# Patient Record
Sex: FEMALE | Race: ASIAN | NOT HISPANIC OR LATINO
[De-identification: names, ages, dates, MRNs, and addresses within clinical notes are randomized per-mention and may not be internally consistent; named-entity substitution may affect disease eponyms.]

---

## 2018-01-26 ENCOUNTER — APPOINTMENT (OUTPATIENT)
Dept: NEUROSURGERY | Facility: HOSPITAL | Age: 63
End: 2018-01-26
Payer: COMMERCIAL

## 2018-01-26 ENCOUNTER — APPOINTMENT (OUTPATIENT)
Dept: NEUROSURGERY | Facility: CLINIC | Age: 63
End: 2018-01-26
Payer: COMMERCIAL

## 2018-01-26 VITALS
BODY MASS INDEX: 20.55 KG/M2 | OXYGEN SATURATION: 97 % | WEIGHT: 116 LBS | HEIGHT: 63 IN | DIASTOLIC BLOOD PRESSURE: 55 MMHG | SYSTOLIC BLOOD PRESSURE: 94 MMHG | HEART RATE: 63 BPM

## 2018-01-26 DIAGNOSIS — Z78.9 OTHER SPECIFIED HEALTH STATUS: ICD-10-CM

## 2018-01-26 DIAGNOSIS — Z87.898 PERSONAL HISTORY OF OTHER SPECIFIED CONDITIONS: ICD-10-CM

## 2018-01-26 DIAGNOSIS — Z82.49 FAMILY HISTORY OF ISCHEMIC HEART DISEASE AND OTHER DISEASES OF THE CIRCULATORY SYSTEM: ICD-10-CM

## 2018-01-26 DIAGNOSIS — Z86.69 PERSONAL HISTORY OF OTHER DISEASES OF THE NERVOUS SYSTEM AND SENSE ORGANS: ICD-10-CM

## 2018-01-26 DIAGNOSIS — Z83.3 FAMILY HISTORY OF DIABETES MELLITUS: ICD-10-CM

## 2018-01-26 DIAGNOSIS — Z87.39 PERSONAL HISTORY OF OTHER DISEASES OF THE MUSCULOSKELETAL SYSTEM AND CONNECTIVE TISSUE: ICD-10-CM

## 2018-01-26 PROBLEM — Z00.00 ENCOUNTER FOR PREVENTIVE HEALTH EXAMINATION: Status: ACTIVE | Noted: 2018-01-26

## 2018-01-26 PROCEDURE — 99205 OFFICE O/P NEW HI 60 MIN: CPT

## 2018-01-26 RX ORDER — CHROMIUM 200 MCG
TABLET ORAL
Refills: 0 | Status: ACTIVE | COMMUNITY

## 2018-01-26 RX ORDER — ASPIRIN 81 MG
600-200 TABLET, DELAYED RELEASE (ENTERIC COATED) ORAL
Refills: 0 | Status: ACTIVE | COMMUNITY

## 2018-01-26 RX ORDER — METOPROLOL SUCCINATE 25 MG/1
25 TABLET, EXTENDED RELEASE ORAL
Refills: 0 | Status: ACTIVE | COMMUNITY

## 2018-01-26 RX ORDER — MULTIVITAMIN
TABLET ORAL
Refills: 0 | Status: ACTIVE | COMMUNITY

## 2018-02-01 ENCOUNTER — OUTPATIENT (OUTPATIENT)
Dept: OUTPATIENT SERVICES | Facility: HOSPITAL | Age: 63
LOS: 1 days | End: 2018-02-01
Payer: COMMERCIAL

## 2018-02-01 PROCEDURE — 71046 X-RAY EXAM CHEST 2 VIEWS: CPT | Mod: 26

## 2018-02-01 PROCEDURE — 71046 X-RAY EXAM CHEST 2 VIEWS: CPT

## 2018-02-05 ENCOUNTER — OUTPATIENT (OUTPATIENT)
Dept: OUTPATIENT SERVICES | Facility: HOSPITAL | Age: 63
LOS: 1 days | Discharge: HOME | End: 2018-02-05
Payer: COMMERCIAL

## 2018-02-05 PROCEDURE — 36224 PLACE CATH CAROTD ART: CPT | Mod: 50

## 2018-02-05 PROCEDURE — 36226 PLACE CATH VERTEBRAL ART: CPT | Mod: LT

## 2018-02-05 PROCEDURE — C1887: CPT

## 2018-02-05 PROCEDURE — 36223 PLACE CATH CAROTID/INOM ART: CPT | Mod: 59,RT

## 2018-02-05 PROCEDURE — C1894: CPT

## 2018-02-05 PROCEDURE — C1769: CPT

## 2018-02-05 PROCEDURE — 36224 PLACE CATH CAROTD ART: CPT | Mod: LT

## 2018-02-05 PROCEDURE — 76377 3D RENDER W/INTRP POSTPROCES: CPT | Mod: 26

## 2018-02-05 RX ORDER — ACETAMINOPHEN 500 MG
650 TABLET ORAL ONCE
Qty: 0 | Refills: 0 | Status: DISCONTINUED | OUTPATIENT
Start: 2018-02-05 | End: 2018-02-20

## 2018-02-05 RX ORDER — CHLORHEXIDINE GLUCONATE 213 G/1000ML
1 SOLUTION TOPICAL ONCE
Qty: 0 | Refills: 0 | Status: DISCONTINUED | OUTPATIENT
Start: 2018-02-05 | End: 2018-02-20

## 2018-02-05 RX ORDER — SODIUM CHLORIDE 9 MG/ML
1000 INJECTION INTRAMUSCULAR; INTRAVENOUS; SUBCUTANEOUS
Qty: 0 | Refills: 0 | Status: DISCONTINUED | OUTPATIENT
Start: 2018-02-05 | End: 2018-02-20

## 2018-02-05 NOTE — BRIEF OPERATIVE NOTE - PROCEDURE
<<-----Click on this checkbox to enter Procedure Cerebral angiography  02/05/2018    Active  GRESTREP

## 2018-02-05 NOTE — BRIEF OPERATIVE NOTE - OPERATION/FINDINGS
Under MAC sedation, using a 4 fr sheath right CFA, cerebral angiogram was performed.  Findings: right opthalmic level bilobed aneurysm measuring approximately 6mm with a wide neck measuring approximately 4mm.  Bilateral carotid and left vertebral arteries segmental vessel irregularities c/w known fibromuscular dysplasia.  Full report to follow.  Patient tolerated procedure well, no new neurological deficit, hemodynamically stable.  Right groin/vasc access site: Direct manual compression applied, hemostasis achieved, no hematoma.  Patient was transferred to PACU and will go home later today. Under MAC sedation, using a 4 fr sheath right CFA, cerebral angiogram was performed.  Findings: right opthalmic level bilobed aneurysm measuring approximately 6mm with a wide neck measuring approximately 4mm.  Bilateral carotid and left vertebral arteries segmental vessel irregularities c/w known fibromuscular dysplasia.  Full report to follow.  Patient tolerated procedure well, no new neurological deficit, hemodynamically stable.  Right groin/vasc access site: Direct manual compression applied, hemostasis achieved, no hematoma.  Patient was transferred to PACU and will go home later today.  Above d/w Dr. Gray

## 2018-02-07 ENCOUNTER — TRANSCRIPTION ENCOUNTER (OUTPATIENT)
Age: 63
End: 2018-02-07

## 2018-02-08 DIAGNOSIS — K21.9 GASTRO-ESOPHAGEAL REFLUX DISEASE WITHOUT ESOPHAGITIS: ICD-10-CM

## 2018-02-08 DIAGNOSIS — I48.0 PAROXYSMAL ATRIAL FIBRILLATION: ICD-10-CM

## 2018-02-08 DIAGNOSIS — E11.9 TYPE 2 DIABETES MELLITUS WITHOUT COMPLICATIONS: ICD-10-CM

## 2018-02-08 DIAGNOSIS — I77.3 ARTERIAL FIBROMUSCULAR DYSPLASIA: ICD-10-CM

## 2018-02-08 DIAGNOSIS — I67.1 CEREBRAL ANEURYSM, NONRUPTURED: ICD-10-CM

## 2018-02-08 DIAGNOSIS — E78.00 PURE HYPERCHOLESTEROLEMIA, UNSPECIFIED: ICD-10-CM

## 2018-02-08 DIAGNOSIS — I65.23 OCCLUSION AND STENOSIS OF BILATERAL CAROTID ARTERIES: ICD-10-CM

## 2018-02-08 DIAGNOSIS — Z79.01 LONG TERM (CURRENT) USE OF ANTICOAGULANTS: ICD-10-CM

## 2018-02-08 DIAGNOSIS — M81.0 AGE-RELATED OSTEOPOROSIS WITHOUT CURRENT PATHOLOGICAL FRACTURE: ICD-10-CM

## 2018-02-08 DIAGNOSIS — I34.1 NONRHEUMATIC MITRAL (VALVE) PROLAPSE: ICD-10-CM

## 2018-02-08 DIAGNOSIS — R73.03 PREDIABETES: ICD-10-CM

## 2018-02-12 ENCOUNTER — APPOINTMENT (OUTPATIENT)
Dept: NEUROSURGERY | Facility: CLINIC | Age: 63
End: 2018-02-12
Payer: COMMERCIAL

## 2018-02-12 VITALS
OXYGEN SATURATION: 100 % | WEIGHT: 115 LBS | HEART RATE: 53 BPM | BODY MASS INDEX: 20.38 KG/M2 | DIASTOLIC BLOOD PRESSURE: 46 MMHG | SYSTOLIC BLOOD PRESSURE: 86 MMHG | HEIGHT: 63 IN

## 2018-02-12 DIAGNOSIS — Z01.818 ENCOUNTER FOR OTHER PREPROCEDURAL EXAMINATION: ICD-10-CM

## 2018-02-12 PROCEDURE — 99215 OFFICE O/P EST HI 40 MIN: CPT

## 2018-02-12 RX ORDER — APIXABAN 5 MG/1
5 TABLET, FILM COATED ORAL
Refills: 0 | Status: DISCONTINUED | COMMUNITY
End: 2018-02-12

## 2018-02-20 LAB — PLATELET RESPONSE ASPIRIN: 389 ARU

## 2018-02-21 ENCOUNTER — INPATIENT (INPATIENT)
Facility: HOSPITAL | Age: 63
LOS: 1 days | Discharge: HOME | DRG: 27 | End: 2018-02-23
Attending: RADIOLOGY | Admitting: RADIOLOGY
Payer: COMMERCIAL

## 2018-02-21 VITALS — SYSTOLIC BLOOD PRESSURE: 113 MMHG | HEART RATE: 86 BPM | DIASTOLIC BLOOD PRESSURE: 40 MMHG | OXYGEN SATURATION: 99 %

## 2018-02-21 DIAGNOSIS — Z98.891 HISTORY OF UTERINE SCAR FROM PREVIOUS SURGERY: Chronic | ICD-10-CM

## 2018-02-21 LAB
ALBUMIN SERPL ELPH-MCNC: 3.7 G/DL — SIGNIFICANT CHANGE UP (ref 3.3–5)
ALP SERPL-CCNC: 40 U/L — SIGNIFICANT CHANGE UP (ref 40–120)
ALT FLD-CCNC: 16 U/L — SIGNIFICANT CHANGE UP (ref 10–45)
ANION GAP SERPL CALC-SCNC: 15 MMOL/L — SIGNIFICANT CHANGE UP (ref 5–17)
AST SERPL-CCNC: 20 U/L — SIGNIFICANT CHANGE UP (ref 10–40)
BILIRUB SERPL-MCNC: 0.4 MG/DL — SIGNIFICANT CHANGE UP (ref 0.2–1.2)
BUN SERPL-MCNC: 15 MG/DL — SIGNIFICANT CHANGE UP (ref 7–23)
CALCIUM SERPL-MCNC: 8.4 MG/DL — SIGNIFICANT CHANGE UP (ref 8.4–10.5)
CHLORIDE SERPL-SCNC: 104 MMOL/L — SIGNIFICANT CHANGE UP (ref 96–108)
CO2 SERPL-SCNC: 23 MMOL/L — SIGNIFICANT CHANGE UP (ref 22–31)
CREAT SERPL-MCNC: 0.75 MG/DL — SIGNIFICANT CHANGE UP (ref 0.5–1.3)
GLUCOSE SERPL-MCNC: 158 MG/DL — HIGH (ref 70–99)
HCT VFR BLD CALC: 33.4 % — LOW (ref 34.5–45)
HGB BLD-MCNC: 11.2 G/DL — LOW (ref 11.5–15.5)
INR BLD: 1.07 — SIGNIFICANT CHANGE UP (ref 0.88–1.16)
LYMPHOCYTES # BLD AUTO: 11.6 % — LOW (ref 13–44)
MCHC RBC-ENTMCNC: 31 PG — SIGNIFICANT CHANGE UP (ref 27–34)
MCHC RBC-ENTMCNC: 33.5 G/DL — SIGNIFICANT CHANGE UP (ref 32–36)
MCV RBC AUTO: 92.5 FL — SIGNIFICANT CHANGE UP (ref 80–100)
MONOCYTES NFR BLD AUTO: 1.4 % — LOW (ref 2–14)
NEUTROPHILS NFR BLD AUTO: 87 % — HIGH (ref 43–77)
PLATELET # BLD AUTO: 233 K/UL — SIGNIFICANT CHANGE UP (ref 150–400)
POTASSIUM SERPL-MCNC: 3.7 MMOL/L — SIGNIFICANT CHANGE UP (ref 3.5–5.3)
POTASSIUM SERPL-SCNC: 3.7 MMOL/L — SIGNIFICANT CHANGE UP (ref 3.5–5.3)
PROT SERPL-MCNC: 6.2 G/DL — SIGNIFICANT CHANGE UP (ref 6–8.3)
PROTHROM AB SERPL-ACNC: 11.9 SEC — SIGNIFICANT CHANGE UP (ref 9.8–12.7)
RBC # BLD: 3.61 M/UL — LOW (ref 3.8–5.2)
RBC # FLD: 12.9 % — SIGNIFICANT CHANGE UP (ref 10.3–16.9)
SODIUM SERPL-SCNC: 142 MMOL/L — SIGNIFICANT CHANGE UP (ref 135–145)
WBC # BLD: 9.1 K/UL — SIGNIFICANT CHANGE UP (ref 3.8–10.5)
WBC # FLD AUTO: 9.1 K/UL — SIGNIFICANT CHANGE UP (ref 3.8–10.5)

## 2018-02-21 PROCEDURE — 61624 TCAT PERM OCCLS/EMBOLJ CNS: CPT

## 2018-02-21 PROCEDURE — 76377 3D RENDER W/INTRP POSTPROCES: CPT | Mod: 26

## 2018-02-21 PROCEDURE — 75898 FOLLOW-UP ANGIOGRAPHY: CPT | Mod: 26

## 2018-02-21 PROCEDURE — 75894 X-RAYS TRANSCATH THERAPY: CPT | Mod: 26

## 2018-02-21 PROCEDURE — 99291 CRITICAL CARE FIRST HOUR: CPT | Mod: 24

## 2018-02-21 PROCEDURE — 36224 PLACE CATH CAROTD ART: CPT | Mod: RT

## 2018-02-21 RX ORDER — FAMOTIDINE 10 MG/ML
20 INJECTION INTRAVENOUS DAILY
Qty: 0 | Refills: 0 | Status: DISCONTINUED | OUTPATIENT
Start: 2018-02-21 | End: 2018-02-21

## 2018-02-21 RX ORDER — ONDANSETRON 8 MG/1
4 TABLET, FILM COATED ORAL ONCE
Qty: 0 | Refills: 0 | Status: DISCONTINUED | OUTPATIENT
Start: 2018-02-21 | End: 2018-02-21

## 2018-02-21 RX ORDER — DOCUSATE SODIUM 100 MG
100 CAPSULE ORAL THREE TIMES A DAY
Qty: 0 | Refills: 0 | Status: DISCONTINUED | OUTPATIENT
Start: 2018-02-21 | End: 2018-02-23

## 2018-02-21 RX ORDER — SODIUM CHLORIDE 9 MG/ML
1000 INJECTION INTRAMUSCULAR; INTRAVENOUS; SUBCUTANEOUS
Qty: 0 | Refills: 0 | Status: DISCONTINUED | OUTPATIENT
Start: 2018-02-21 | End: 2018-02-22

## 2018-02-21 RX ORDER — ACETAMINOPHEN 500 MG
650 TABLET ORAL EVERY 6 HOURS
Qty: 0 | Refills: 0 | Status: DISCONTINUED | OUTPATIENT
Start: 2018-02-21 | End: 2018-02-23

## 2018-02-21 RX ORDER — METOPROLOL TARTRATE 50 MG
25 TABLET ORAL DAILY
Qty: 0 | Refills: 0 | Status: DISCONTINUED | OUTPATIENT
Start: 2018-02-21 | End: 2018-02-23

## 2018-02-21 RX ORDER — CALCIUM CARBONATE 500(1250)
600 TABLET ORAL
Qty: 0 | Refills: 0 | COMMUNITY

## 2018-02-21 RX ORDER — ONDANSETRON 8 MG/1
4 TABLET, FILM COATED ORAL EVERY 6 HOURS
Qty: 0 | Refills: 0 | Status: DISCONTINUED | OUTPATIENT
Start: 2018-02-21 | End: 2018-02-23

## 2018-02-21 RX ORDER — FOLIC ACID 0.8 MG
1 TABLET ORAL ONCE
Qty: 0 | Refills: 0 | Status: DISCONTINUED | OUTPATIENT
Start: 2018-02-21 | End: 2018-02-21

## 2018-02-21 RX ORDER — INSULIN LISPRO 100/ML
VIAL (ML) SUBCUTANEOUS EVERY 6 HOURS
Qty: 0 | Refills: 0 | Status: DISCONTINUED | OUTPATIENT
Start: 2018-02-21 | End: 2018-02-23

## 2018-02-21 RX ORDER — ASPIRIN/CALCIUM CARB/MAGNESIUM 324 MG
81 TABLET ORAL DAILY
Qty: 0 | Refills: 0 | Status: DISCONTINUED | OUTPATIENT
Start: 2018-02-21 | End: 2018-02-23

## 2018-02-21 RX ORDER — SENNA PLUS 8.6 MG/1
2 TABLET ORAL AT BEDTIME
Qty: 0 | Refills: 0 | Status: DISCONTINUED | OUTPATIENT
Start: 2018-02-21 | End: 2018-02-23

## 2018-02-21 RX ORDER — ASPIRIN/CALCIUM CARB/MAGNESIUM 324 MG
1 TABLET ORAL
Qty: 0 | Refills: 0 | COMMUNITY

## 2018-02-21 RX ORDER — FOLIC ACID 0.8 MG
1 TABLET ORAL DAILY
Qty: 0 | Refills: 0 | Status: DISCONTINUED | OUTPATIENT
Start: 2018-02-21 | End: 2018-02-23

## 2018-02-21 RX ORDER — DOCUSATE SODIUM 100 MG
100 CAPSULE ORAL ONCE
Qty: 0 | Refills: 0 | Status: DISCONTINUED | OUTPATIENT
Start: 2018-02-21 | End: 2018-02-21

## 2018-02-21 RX ORDER — FAMOTIDINE 10 MG/ML
20 INJECTION INTRAVENOUS ONCE
Qty: 0 | Refills: 0 | Status: COMPLETED | OUTPATIENT
Start: 2018-02-21 | End: 2018-02-21

## 2018-02-21 RX ORDER — ACETAMINOPHEN 500 MG
650 TABLET ORAL EVERY 6 HOURS
Qty: 0 | Refills: 0 | Status: DISCONTINUED | OUTPATIENT
Start: 2018-02-21 | End: 2018-02-21

## 2018-02-21 RX ORDER — METOPROLOL TARTRATE 50 MG
25 TABLET ORAL ONCE
Qty: 0 | Refills: 0 | Status: DISCONTINUED | OUTPATIENT
Start: 2018-02-21 | End: 2018-02-21

## 2018-02-21 RX ORDER — CLOPIDOGREL BISULFATE 75 MG/1
75 TABLET, FILM COATED ORAL DAILY
Qty: 0 | Refills: 0 | Status: DISCONTINUED | OUTPATIENT
Start: 2018-02-21 | End: 2018-02-23

## 2018-02-21 RX ORDER — CLOPIDOGREL BISULFATE 75 MG/1
1 TABLET, FILM COATED ORAL
Qty: 0 | Refills: 0 | COMMUNITY

## 2018-02-21 RX ORDER — CHLORHEXIDINE GLUCONATE 213 G/1000ML
1 SOLUTION TOPICAL ONCE
Qty: 0 | Refills: 0 | Status: DISCONTINUED | OUTPATIENT
Start: 2018-02-21 | End: 2018-02-23

## 2018-02-21 RX ORDER — SENNA PLUS 8.6 MG/1
2 TABLET ORAL ONCE
Qty: 0 | Refills: 0 | Status: DISCONTINUED | OUTPATIENT
Start: 2018-02-21 | End: 2018-02-21

## 2018-02-21 RX ORDER — ATORVASTATIN CALCIUM 80 MG/1
20 TABLET, FILM COATED ORAL AT BEDTIME
Qty: 0 | Refills: 0 | Status: DISCONTINUED | OUTPATIENT
Start: 2018-02-21 | End: 2018-02-23

## 2018-02-21 RX ORDER — METOPROLOL TARTRATE 50 MG
1 TABLET ORAL
Qty: 0 | Refills: 0 | COMMUNITY

## 2018-02-21 RX ORDER — ATORVASTATIN CALCIUM 80 MG/1
1 TABLET, FILM COATED ORAL
Qty: 0 | Refills: 0 | COMMUNITY

## 2018-02-21 RX ORDER — FOLIC ACID 0.8 MG
1 TABLET ORAL
Qty: 0 | Refills: 0 | COMMUNITY

## 2018-02-21 RX ADMIN — SODIUM CHLORIDE 100 MILLILITER(S): 9 INJECTION INTRAMUSCULAR; INTRAVENOUS; SUBCUTANEOUS at 15:25

## 2018-02-21 RX ADMIN — Medication 100 MILLIGRAM(S): at 22:05

## 2018-02-21 RX ADMIN — ATORVASTATIN CALCIUM 20 MILLIGRAM(S): 80 TABLET, FILM COATED ORAL at 22:03

## 2018-02-21 RX ADMIN — Medication 2: at 17:53

## 2018-02-21 RX ADMIN — SENNA PLUS 2 TABLET(S): 8.6 TABLET ORAL at 22:04

## 2018-02-21 RX ADMIN — Medication 1 MILLIGRAM(S): at 22:04

## 2018-02-21 RX ADMIN — FAMOTIDINE 20 MILLIGRAM(S): 10 INJECTION INTRAVENOUS at 14:46

## 2018-02-21 NOTE — H&P ADULT - PMH
Brain aneurysm    Fibromuscular dysplasia  cervical carptids.  MVP (mitral valve prolapse)  No significant MR (per Cardiologist) Brain aneurysm    Fibromuscular dysplasia  cervical carotids.  MVP (mitral valve prolapse)  No significant MR (per Cardiologist)

## 2018-02-21 NOTE — H&P ADULT - NSHPPHYSICALEXAM_GEN_ALL_CORE
General: Pleasant lady in NAD  Neuro: A&O x 3, speech clear and fluent, EOMI, PERRLA  CN 2-12 intact  Motor: 5/5 X 4, no pronator drift  Sensory: Intact.  Gait and balance: Normal  Heart: S1-S2, RRR  Lungs: CTA B/L, no rales, no rhonchi  Abd: Soft, NT, ND, + BS x4  Skin: NO rashes, no ecchymosis.  LE Pulses: Right DP 2+, PT 1+, Left: DP 2+, PT 1+

## 2018-02-21 NOTE — PROGRESS NOTE ADULT - SUBJECTIVE AND OBJECTIVE BOX
=================================  NEUROCRITICAL CARE ATTENDING NOTE  =================================    RAO MORGAN   MRN-[id number]  Summary:  62y/F with Afib on chronic anticoagualtion, (Eliquis) FMD.  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  Diagnostic angio confirmed aneurysm.  Admitted today  for elective coiling of aneurysm.  Overnight Events:  Admitted to NSICU this afternoon.    PAST MEDICAL & SURGICAL HISTORY: Fibromuscular dysplasia: cervical carotids. Brain aneurysm MVP (mitral valve prolapse): No significant MR (per Cardiologist) H/O: : x 2  NKDA  home meds:  metoprolol 25mg OD plavix 75mg daily as 81mg daily folic acid atorvastatin 20mg daily caltrate 600mg daily     PHYSICAL EXAMINATION  T(C): 37.1 ( @ 17:11), Max: 37.1 ( @ 17:11) HR: 88 ( @ 17:11) (82 - 98) BP: 101/46 ( @ 17:11) (99/44 - 113/40) RR: 52 ( @ 17:11) (15 - 52)SpO2: 98% ( @ 17:11) (98% - 99%)  NEUROLOGIC EXAMINATION:  Patient is awake, alert, fully oriented, pupils 2-3mm equal and briskly reactive to light, EOMs intact, muscle strength 5/5 on all 4 extremities  GENERAL:  not intubated, not in cardiorespiratory distress  EENT: anicteric  CARDIOVASC:  (+) S1 S2, normal rate and irregularly irregular rhythm  PULMONARY:  clear to auscultation bilaterally  ABDOMEN:  soft, nontender, with normoactive bowel sounds  EXTREMITIES:  no edema, no groin hematoma, R, distal pulses strong  SKIN:  no rash    LABS:  CAPILLARY BLOOD GLUCOSE 159     @ 07:01  -   @ 18:50  IN: 300 mL / OUT: 195 mL / NET: 105 mL    Bacteriology:  CSF studies:  EEG:  Neuroimaging: none in the system  Other imagin/01 CXR:  clear lungs    MEDICATIONS: asa 81mg daily atorvastatin 20mg HS peridex plavix 75mg daily docusate 100mg x1 famotidine 20mg daily folic acid 1mg x1 mod ISS [id number] x1 senna PRN    IV FLUIDS: NS@100cc/hr  DRIPS:  DIET: regular diet  Lines: Agatha Hendrickson  Drains:      CODE STATUS:  full code                       GOALS OF CARE:  aggressive                      DISPOSITION:  ICU

## 2018-02-21 NOTE — PROGRESS NOTE ADULT - SUBJECTIVE AND OBJECTIVE BOX
NEUROSURGERY POST OP NOTE:    POD# 0 S/P cerebral angiography with embolization of Right superior hypophyseal artery aneurysm    S: Patient is doing well post op. C/o chest discomfort from GERD. Gaye, HA, CP, SOB.       T(C): --  HR: 86 (02-21-18 @ 14:41) (82 - 98)  BP: 104/51 (02-21-18 @ 14:41) (99/44 - 113/40)  RR: 18 (02-21-18 @ 14:41) (15 - 29)  SpO2: 98% (02-21-18 @ 14:41) (98% - 99%)        acetaminophen   Tablet. 650 milliGRAM(s) Oral every 6 hours PRN  aspirin enteric coated 81 milliGRAM(s) Oral daily  atorvastatin 20 milliGRAM(s) Oral at bedtime  chlorhexidine 4% Liquid 1 Application(s) Topical Once  clopidogrel Tablet 75 milliGRAM(s) Oral daily  docusate sodium 100 milliGRAM(s) Oral Once  famotidine    Tablet 20 milliGRAM(s) Oral daily  folic acid 1 milliGRAM(s) Oral Once  metoprolol succinate ER 25 milliGRAM(s) Oral Once  ondansetron Injectable 4 milliGRAM(s) IV Push Once PRN  senna 2 Tablet(s) Oral Once PRN  sodium chloride 0.9%. 1000 milliLiter(s) IV Continuous <Continuous>      RADIOLOGY:     Exam:  Gen: laying down flat in hospital bed comfortable, NAD  Neuro: AA+Ox3, following commands, opens eyes spontaneously  CN II-XII: grossly intact, EOMI, PERRL, face symmetric  MAEx4, strength R>L  Pulses: DP 2+ b/l    WOUND/DRAINS: R groin dressing C/D/I, in inflammation or erythema     Assessment: 63yo Female s/p cerebral angiography with embolization of R superior hypophyseal artery aneurysm POD #0      Plan:  - Neuro check Q1 hour  - pain control  - transfer to ICU  - HOB flat to 15 degrees     D/w Dr. Gray

## 2018-02-21 NOTE — BRIEF OPERATIVE NOTE - OPERATION/FINDINGS
Under general anesthesia, using a 6 fr sheath right CFA, cerebral angiogram was performed.  Findings c/w approximately 6mm medially pointing wide neck aneurysm.  Attempted balloon assisted coil embolization unsuccessful, stent assisted coil embolization using a ( LVIS Blue 4.5 x 23 cm and microcoils x2)  Full report to follow  Patient tolerated procedure well, remained hemodynamically stable, no new neurological deficits.  Right groin/vasc access site: Perclose, hemostasis achieved, no hematoma.  Patient was transferred to PACU  Above d/w Dr. Gray

## 2018-02-21 NOTE — PROGRESS NOTE ADULT - ASSESSMENT
62F with   1.  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 Gray, Dr. Mina)  2.  fibromuscular dysplasia of cervical carotids  3.  mitral valve prolapse  4.  atrial fibrillation in controlled ventricular rate    PLAN:   NEURO: neurochecks q1h, PRN pain meds with Tylenol  s/p stent-coil of aneurysm; continue dual antiplatelet therapy, no accumetrics as per neurointerventional  REHAB:  physical therapy evaluation and management    EARLY MOB:  flat x 4 hours, then HOB up    PULM:  Room air, incentive spirometry  CARDIO:  SBP goal 100-150mm Hg, Afib rate controlled - currently off anticoagulation, continue metoprolol; baseline EKG  ENDO:  Blood sugar goals 140-180 mg/dL, continue insulin sliding scale  GI:  d/c famotidine  DIET: regular diet  RENAL:  d/c IVF once eating adequately  HEM/ONC: check post-op Hb  VTE Prophylaxis: SCDs only, no DVT chemoprophylaxis for now as patient is high risk for bleed (fresh post-op)  ID: afebrile, no leukocytosis  Social: will update family    Patient at high risk for neurological deterioration or death due to:  groin hematoma, hypotension / shock, ICU delirium, DVT / PE, cardioembolic stroke.	  Critical care time, excluding procedures: 60 minutes.

## 2018-02-21 NOTE — H&P ADULT - FAMILY HISTORY
Mother  Still living? No  Family history of CHF (congestive heart failure), Age at diagnosis: Age Unknown  Family history of cardiomyopathy, Age at diagnosis: Age Unknown

## 2018-02-21 NOTE — H&P ADULT - PROBLEM SELECTOR PLAN 1
Cerebral angiogram and endovascular balloon/stent assisted coil embolization of right Superior hypophyseal artery aneurysm.  NSICU for overnight observation  Neuro checks  Right arteriotomy site checks, along with vitals and pulses.  Continue Plavix and Aspirin.

## 2018-02-21 NOTE — H&P ADULT - HISTORY OF PRESENT ILLNESS
62 years-old woman with history of atrial fibrillation on chronic anticoagulation (Eliquis) and fibromuscular dysplasia. 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 She isn’t a smoker and does not have family history of cerebral aneurysms. a diagnostic cerebral angiogram was performed that confirmed the presence of a wide neck irregular 6 mm aneurysm of the right superior hypophyseal artery. There was also evidence of fibromuscular dysplasia in bilateral ICAs.   After discussion in cerebrovascular conference it was determined that the aneurysm should be treated because of size and irregular shape. We are recommending balloon assisted coil embolization.   Patient presents for above named procedure, she has no neurological deficits. 62 years-old woman with history of atrial fibrillation on chronic anticoagulation (Eliquis) and fibromuscular dysplasia. 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 She isn’t a smoker and does not have family history of cerebral aneurysms. a diagnostic cerebral angiogram was performed that confirmed the presence of a wide neck irregular 6 mm aneurysm of the right superior hypophyseal artery. There was also evidence of fibromuscular dysplasia in bilateral ICAs.   After discussion in cerebrovascular conference it was determined that the aneurysm should be treated because of size and irregular shape. We are recommending balloon assisted coil embolization.  Patient presents for above named procedure, she has no neurological deficits. 62 years-old woman with history of atrial fibrillation on chronic anticoagulation (Eliquis) and fibromuscular dysplasia. 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 She isn’t a smoker and does not have family history of cerebral aneurysms. a diagnostic cerebral angiogram was performed that confirmed the presence of a wide neck irregular 6 mm aneurysm of the right superior hypophyseal artery. There was also evidence of fibromuscular dysplasia in bilateral ICAs.   After discussion in cerebrovascular conference it was determined that the aneurysm should be treated because of size and irregular shape.  Patient presents for cerebral angiography and balloon/Stent assisted coil embolization of aneurysm, she has no neurological deficits.

## 2018-02-21 NOTE — BRIEF OPERATIVE NOTE - PROCEDURE
<<-----Click on this checkbox to enter Procedure Cerebral angiography with embolization of intracranial aneurysm  02/21/2018  Stent Assisted Coil Embolization of Aneurysm  Active  GRESTREP

## 2018-02-22 ENCOUNTER — TRANSCRIPTION ENCOUNTER (OUTPATIENT)
Age: 63
End: 2018-02-22

## 2018-02-22 LAB
ANION GAP SERPL CALC-SCNC: 8 MMOL/L — SIGNIFICANT CHANGE UP (ref 5–17)
BUN SERPL-MCNC: 15 MG/DL — SIGNIFICANT CHANGE UP (ref 7–23)
CALCIUM SERPL-MCNC: 8.2 MG/DL — LOW (ref 8.4–10.5)
CHLORIDE SERPL-SCNC: 107 MMOL/L — SIGNIFICANT CHANGE UP (ref 96–108)
CO2 SERPL-SCNC: 26 MMOL/L — SIGNIFICANT CHANGE UP (ref 22–31)
CREAT SERPL-MCNC: 0.65 MG/DL — SIGNIFICANT CHANGE UP (ref 0.5–1.3)
GLUCOSE SERPL-MCNC: 140 MG/DL — HIGH (ref 70–99)
HCT VFR BLD CALC: 29.7 % — LOW (ref 34.5–45)
HGB BLD-MCNC: 10.1 G/DL — LOW (ref 11.5–15.5)
MAGNESIUM SERPL-MCNC: 2.2 MG/DL — SIGNIFICANT CHANGE UP (ref 1.6–2.6)
MCHC RBC-ENTMCNC: 31.2 PG — SIGNIFICANT CHANGE UP (ref 27–34)
MCHC RBC-ENTMCNC: 34 G/DL — SIGNIFICANT CHANGE UP (ref 32–36)
MCV RBC AUTO: 91.7 FL — SIGNIFICANT CHANGE UP (ref 80–100)
PHOSPHATE SERPL-MCNC: 3.5 MG/DL — SIGNIFICANT CHANGE UP (ref 2.5–4.5)
PLATELET # BLD AUTO: 205 K/UL — SIGNIFICANT CHANGE UP (ref 150–400)
POTASSIUM SERPL-MCNC: 3.9 MMOL/L — SIGNIFICANT CHANGE UP (ref 3.5–5.3)
POTASSIUM SERPL-SCNC: 3.9 MMOL/L — SIGNIFICANT CHANGE UP (ref 3.5–5.3)
RBC # BLD: 3.24 M/UL — LOW (ref 3.8–5.2)
RBC # FLD: 13.5 % — SIGNIFICANT CHANGE UP (ref 10.3–16.9)
SODIUM SERPL-SCNC: 141 MMOL/L — SIGNIFICANT CHANGE UP (ref 135–145)
WBC # BLD: 8.9 K/UL — SIGNIFICANT CHANGE UP (ref 3.8–10.5)
WBC # FLD AUTO: 8.9 K/UL — SIGNIFICANT CHANGE UP (ref 3.8–10.5)

## 2018-02-22 PROCEDURE — 99232 SBSQ HOSP IP/OBS MODERATE 35: CPT

## 2018-02-22 RX ORDER — POTASSIUM CHLORIDE 20 MEQ
20 PACKET (EA) ORAL ONCE
Qty: 0 | Refills: 0 | Status: COMPLETED | OUTPATIENT
Start: 2018-02-22 | End: 2018-02-22

## 2018-02-22 RX ADMIN — Medication 20 MILLIEQUIVALENT(S): at 07:32

## 2018-02-22 RX ADMIN — ATORVASTATIN CALCIUM 20 MILLIGRAM(S): 80 TABLET, FILM COATED ORAL at 21:03

## 2018-02-22 RX ADMIN — Medication 25 MILLIGRAM(S): at 10:00

## 2018-02-22 RX ADMIN — Medication 100 MILLIGRAM(S): at 14:37

## 2018-02-22 RX ADMIN — CLOPIDOGREL BISULFATE 75 MILLIGRAM(S): 75 TABLET, FILM COATED ORAL at 12:00

## 2018-02-22 RX ADMIN — Medication 100 MILLIGRAM(S): at 05:17

## 2018-02-22 RX ADMIN — Medication 81 MILLIGRAM(S): at 12:00

## 2018-02-22 RX ADMIN — Medication 1 MILLIGRAM(S): at 12:00

## 2018-02-22 NOTE — DISCHARGE NOTE ADULT - PLAN OF CARE
resume ADLs call to schedule follow up appointment with Dr. Gray in 2 weeks  if groin swelling, redness, worsening headache noted please call office immediately or report to ED

## 2018-02-22 NOTE — DISCHARGE NOTE ADULT - MEDICATION SUMMARY - MEDICATIONS TO TAKE
I will START or STAY ON the medications listed below when I get home from the hospital:    aspirin 81 mg oral tablet  -- 1 tab(s) by mouth once a day  -- Indication: For Brain aneurysm    Caltrate 600 mg oral tablet  -- 600 milligram(s) orally  -- Indication: For Fibromuscular dysplasia    atorvastatin 20 mg oral tablet  -- 1 tab(s) by mouth once a day  -- Indication: For HLD    Plavix 75 mg oral tablet  -- 1 tab(s) by mouth once a day  -- Indication: For Brain aneurysm    metoprolol succinate 25 mg oral tablet, extended release  -- 1 tab(s) by mouth once a day  -- Indication: For HTN    folic acid 1 mg oral tablet  -- 1 tab(s) by mouth once a day  -- Indication: For Nutritional

## 2018-02-22 NOTE — PROGRESS NOTE ADULT - ASSESSMENT
Right superior hypophyseal aneurysm status post stent assisted coiling    Continue aspirin and Plavix    Follow up MRA brain in 3 months.  Follow up cerebral angiogram in 6 months.

## 2018-02-22 NOTE — DISCHARGE NOTE ADULT - HOSPITAL COURSE
Patient admitted for elective rt CFA catheter angio. Under general anesthesia, using a 6 fr sheath right CFA, cerebral angiogram was performed.  Findings c/w approximately 6mm medially pointing wide neck aneurysm. Attempted balloon assisted coil embolization unsuccessful, stent assisted coil embolization using a ( LVIS Blue 4.5 x 23 cm and microcoils x2)    Patient tolerated procedure well, remained hemodynamically stable, no new neurological deficits. Right groin/vasc access site: Perclose, hemostasis achieved, no hematoma. Patient was transferred to PACU. a line and lunsford removed POD 1. Patient cleared for dc home, instructed to return to clinic for follow up in 2 weeks. Patient admitted for elective rt CFA catheter angio. Under general anesthesia, using a 6 fr sheath right CFA, cerebral angiogram was performed.  Findings c/w approximately 6mm medially pointing wide neck aneurysm. Attempted balloon assisted coil embolization unsuccessful, stent assisted coil embolization using a ( LVIS Blue 4.5 x 23 cm and microcoils x2)    Patient tolerated procedure well, remained hemodynamically stable, no new neurological deficits. Right groin/vasc access site: Perclose, hemostasis achieved, no hematoma. Patient was transferred to PACU. a line and lunsford removed POD 1. Dizzy and unable to tolerate >15 feet on POD#1, kept inpatient for further observation. Patient cleared for dc home by PT on POD#2, instructed to return to clinic for follow up in 2 weeks. Dizziness improved/resolved.

## 2018-02-22 NOTE — DISCHARGE NOTE ADULT - CARE PROVIDER_API CALL
Jeremiah Gray), Neurology; Vascular Neurology  [address], [place] [address]  Phone: [phone]  Fax: [phone]

## 2018-02-22 NOTE — DISCHARGE NOTE ADULT - NS AS ACTIVITY OBS
Walking-Outdoors allowed/Showering allowed/Walking-Indoors allowed/Bathing allowed/No Heavy lifting/straining/Stairs allowed

## 2018-02-22 NOTE — PHYSICAL THERAPY INITIAL EVALUATION ADULT - SENSORY TESTS
(R) hand dominant; (L) hand  5/5, (R) hand  5/5. CN Testing: B/L Frontalis intact; B/L buccinator intact; smile symmetrical; tongue protrusion at midline; B/L eyes open/close intact; Shoulder elevation: intact bilaterally; Vision H-Test: bilateral tracking and smooth pursuit intact; Convergence/Divergence: intact; Vision Quadrant Test: intact bilaterally. Rapid alternating movements: intact bilaterally

## 2018-02-22 NOTE — DISCHARGE NOTE ADULT - ADDITIONAL INSTRUCTIONS
Dr. Gray in 2 weeks Dr. Gray in 2 weeks, please call the office for an appointment.  May shower, steri-strips on right groin will fall off on their own.

## 2018-02-22 NOTE — PHYSICAL THERAPY INITIAL EVALUATION ADULT - GAIT DISTANCE, PT EVAL
30 feet x 1 (gait distance limited 2/2 patient with increased complaints of "feeling like a bobble head/very lightheaded"; VSS, HS=797/66, HR=88, JOEY humphreys

## 2018-02-22 NOTE — PROGRESS NOTE ADULT - SUBJECTIVE AND OBJECTIVE BOX
HPI:  62 years-old woman with history of atrial fibrillation on chronic anticoagulation (Eliquis) and fibromuscular dysplasia. 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 She isn’t a smoker and does not have family history of cerebral aneurysms. a diagnostic cerebral angiogram was performed that confirmed the presence of a wide neck irregular 6 mm aneurysm of the right superior hypophyseal artery. There was also evidence of fibromuscular dysplasia in bilateral ICAs.   After discussion in cerebrovascular conference it was determined that the aneurysm should be treated because of size and irregular shape.  Patient presents for cerebral angiography and balloon/Stent assisted coil embolization of aneurysm, she has no neurological deficits. (21 Feb 2018 07:10)    catheter angio yesterday  PACU overnight  mild headache, denies any CP, SOB or difficulty breathing    OVERNIGHT EVENTS:  Vital Signs Last 24 Hrs  T(C): 36.8 (22 Feb 2018 08:00), Max: 37.1 (21 Feb 2018 17:11)  T(F): 98.3 (22 Feb 2018 08:00), Max: 98.8 (21 Feb 2018 17:11)  HR: 64 (22 Feb 2018 09:00) (54 - 98)  BP: 107/44 (22 Feb 2018 09:00) (87/38 - 113/40)  BP(mean): 70 (22 Feb 2018 09:00) (53 - 76)  RR: 19 (22 Feb 2018 09:00) (14 - 44)  SpO2: 100% (22 Feb 2018 09:00) (98% - 100%)    I&O's Detail    21 Feb 2018 07:01  -  22 Feb 2018 07:00  --------------------------------------------------------  IN:    Oral Fluid: 450 mL    sodium chloride 0.9%.: 1700 mL  Total IN: 2150 mL    OUT:    Indwelling Catheter - Urethral: 815 mL  Total OUT: 815 mL    Total NET: 1335 mL      22 Feb 2018 07:01  -  22 Feb 2018 09:07  --------------------------------------------------------  IN:    Oral Fluid: 100 mL    sodium chloride 0.9%.: 200 mL  Total IN: 300 mL    OUT:    Indwelling Catheter - Urethral: 60 mL  Total OUT: 60 mL    Total NET: 240 mL        I&O's Summary    21 Feb 2018 07:01  -  22 Feb 2018 07:00  --------------------------------------------------------  IN: 2150 mL / OUT: 815 mL / NET: 1335 mL    22 Feb 2018 07:01  -  22 Feb 2018 09:07  --------------------------------------------------------  IN: 300 mL / OUT: 60 mL / NET: 240 mL        PHYSICAL EXAM:  Neurological:  A&O x 3, comfortable  EOMI, PERRL  face symmetric, neg drift  CALDERÓN x 4, motor 5/5 throughout  breathing non labored  abd soft NTND  distal pulses intact  groin site c/d/i    TUBES/LINES:  [] CVC  [x] A-line  [] Lumbar Drain  [] Ventriculostomy  [] Other    DIET:  [] NPO  [x] Mechanical  [] Tube feeds    LABS:                        10.1   8.9   )-----------( 205      ( 22 Feb 2018 04:16 )             29.7     02-22    141  |  107  |  15  ----------------------------<  140<H>  3.9   |  26  |  0.65    Ca    8.2<L>      22 Feb 2018 04:16  Phos  3.5     02-22  Mg     2.2     02-22    TPro  6.2  /  Alb  3.7  /  TBili  0.4  /  DBili  x   /  AST  20  /  ALT  16  /  AlkPhos  40  02-21    PT/INR - ( 21 Feb 2018 21:04 )   PT: 11.9 sec;   INR: 1.07                  CAPILLARY BLOOD GLUCOSE      POCT Blood Glucose.: 98 mg/dL (22 Feb 2018 05:21)  POCT Blood Glucose.: 138 mg/dL (22 Feb 2018 00:19)  POCT Blood Glucose.: 159 mg/dL (21 Feb 2018 17:42)      Drug Levels: [] N/A    CSF Analysis: [] N/A      Allergies    No Known Allergies    Intolerances      MEDICATIONS:  Antibiotics:    Neuro:  acetaminophen   Tablet 650 milliGRAM(s) Oral every 6 hours PRN  acetaminophen   Tablet. 650 milliGRAM(s) Oral every 6 hours PRN  ondansetron Injectable 4 milliGRAM(s) IV Push every 6 hours PRN    Anticoagulation:  aspirin enteric coated 81 milliGRAM(s) Oral daily  clopidogrel Tablet 75 milliGRAM(s) Oral daily    OTHER:  atorvastatin 20 milliGRAM(s) Oral at bedtime  chlorhexidine 4% Liquid 1 Application(s) Topical Once  docusate sodium 100 milliGRAM(s) Oral three times a day  insulin lispro (HumaLOG) corrective regimen sliding scale   SubCutaneous every 6 hours  metoprolol succinate ER 25 milliGRAM(s) Oral daily  senna 2 Tablet(s) Oral at bedtime    IVF:  folic acid 1 milliGRAM(s) Oral daily  sodium chloride 0.9%. 1000 milliLiter(s) IV Continuous <Continuous>    CULTURES:    RADIOLOGY & ADDITIONAL TESTS:      ASSESSMENT:  62y Female s/p cath angio POD 1 neuro intact    PLAN:  NEURO:  neuro checks  tylenol prn  cont asa/plavix  dispo planning    CARDIOVASCULAR:  stable  dc a line    PULMONARY:  RA    RENAL:  HLIV  d/c lunsford    GI:  diet as tolerated  bowel regimen    HEME:  h/h stable    ID:  afeb  no abx    ENDO:  ISS    DVT PROPHYLAXIS:  [x] Venodynes                                [] Heparin/Lovenox    DISPOSITION:   SDU status  full code  dispo planning  d/w Dr. Gray and ICU house staff

## 2018-02-22 NOTE — DISCHARGE NOTE ADULT - CARE PLAN
Principal Discharge DX:	Brain aneurysm  Goal:	resume ADLs  Assessment and plan of treatment:	call to schedule follow up appointment with Dr. Gray in 2 weeks  if groin swelling, redness, worsening headache noted please call office immediately or report to ED

## 2018-02-22 NOTE — PATIENT PROFILE ADULT. - NS TRANSFER RESPONSE BELONGING
History of Present Illness





- General


History Source: Patient


Exam Limitations: No Limitations





- History of Present Illness


Initial Comments: 


CHIEF COMPLAINT:  40 y/o [id number] with known uterine fibroids and IUD c/o 

abnormal vaginal bleeding for the past 2 days.  





HISTORY OF PRESENT ILLNESS:  The patient states her LMP was 5/15/17 and are 

normally every 28 days.  She states 2 days ago she started having vaginal 

bleeding that she feels is not normal menstrual bleeding.  She does also have 

some lower abdominal cramping.  She has had to change her pad twice a day.  She 

denies f/c, n/v/d, CP, SOB, back pain, hematuria, dysuria.  She recently moved 

here and does not have an OB/GYN. 





Vital signs on arrival are within normal limits. 





REVIEW OF SYSTEMS:


GENERAL/CONSTITUTIONAL: No fever/chills. No weakness. No weight change.


HEAD, EYES, EARS, NOSE AND THROAT: No change in vision. No ear pain or 

discharge. No sore throat.


CARDIOVASCULAR: No chest pain or shortness of breath.


RESPIRATORY: No cough, wheezing, or hemoptysis.


GASTROINTESTINAL: +lower abdominal pain.  No nausea, vomiting, diarrhea, 

constipation. 


GENITOURINARY: No dysuria, frequency, or change in urination.  +vaginal bleeding


MUSCULOSKELETAL: No joint or muscle swelling or pain. No neck or back pain.


SKIN: No rash or easy bruising.


NEUROLOGIC: No headache, vertigo, loss of consciousness, or loss of sensation.





PHYSICAL EXAM:


GENERAL: The patient is awake, alert, and fully oriented, in no acute distress.

  She is very well appearing, ambulatory, in NAD or obvious discomfort. 


HEAD: Normal with no signs of trauma.


ENT: Pupils equal, round and reactive to light, extraocular movements intact, 

sclera anicteric, conjunctiva clear. Neck supple.


LUNGS: Clear to auscultation bilaterally. Normal excursion. No respiratory 

distress or use of accessory muscles.


CV: RRR, S1/S2, no MRG. Cap refill < 2 sec.


ABDOMEN: Soft, non-distended, non-tender even to deep palpation, no 

hepatomegaly or splenomegaly, no masses.


VAGINAL:  Copious red blood with large clot in the vaginal canal.  Cervix 

cannot be visualized secondary to blood.  Manual exam reveals minimal CMT.  No 

adenexal tenderness b/l. 


EXTREMITIES: Normal range of motion, no edema.


NEUROLOGICAL: Normal speech, normal gait. CN II-XII grossly intact.


PSYCH: Normal mood, normal affect.


SKIN: Warm, dry, normal turgor, no rashes or lesions noted.














<Lisette Field - Last Filed: 17 10:36>





<Kiki Montalvo - Last Filed: 17 15:39>





- General


Chief Complaint: Vaginal Bleeding


Stated Complaint: VAGINAL BLEEDING


Time Seen by Provider: 17 08:24





Past History





- Past Medical History


Asthma: Yes





- Psycho/Social/Smoking Cessation Hx


Anxiety: No


Suicidal Ideation: No


Smoking History: Never smoked


Hx Alcohol Use: Yes (occasionally)


Drug/Substance Use Hx: No





<Lisette Field - Last Filed: 17 10:36>





<Kiki Montalvo - Last Filed: 17 15:39>





- Past Medical History


Allergies/Adverse Reactions: 


 Allergies











Allergy/AdvReac Type Severity Reaction Status Date / Time


 


No Known Allergies Allergy   Verified 17 08:11











Home Medications: 


Ambulatory Orders





NK [No Known Home Medication]  17 











*Physical Exam





- Vital Signs


 Last Vital Signs











Temp Pulse Resp BP Pulse Ox


 


 97.7 F   63   16   114/68   100 


 


 17 08:09  17 08:09  17 08:09  17 08:09  17 08:09














<Lisette Field - Last Filed: 17 10:36>





- Vital Signs


 Last Vital Signs











Temp Pulse Resp BP Pulse Ox


 


 98 F   64   16   109/71   100 


 


 17 10:53  17 10:53  17 10:53  17 10:53  17 10:53














<Kiki Montalvo - Last Filed: 17 15:39>





ED Treatment Course





- LABORATORY


CBC & Chemistry Diagram: 


 17 09:10





 17 09:10





<Lisette Field - Last Filed: 17 10:36>





- LABORATORY


CBC & Chemistry Diagram: 


 17 09:10





 17 09:10





- ADDITIONAL ORDERS


Additional order review: 














 17 08:42 Urine Culture - Final





 Urine - Urine Clean Catch    NO GROWTH OBTAINED








 











  17





  09:10


 


RBC  3.83


 


MCV  90.2


 


MCHC  33.9


 


RDW  13.5


 


MPV  8.4


 


Neutrophils %  47.7


 


Lymphocytes %  33.1


 


Monocytes %  13.0 H


 


Eosinophils %  4.9 H


 


Basophils %  1.3














<Kiki Montalvo - Last Filed: 17 15:39>





Medical Decision Making





- Medical Decision Making


A/P:  40 y/o female with abnormal vaginal bleeding.  Plan is as follows:





1. UA/culture/hcg


2. Labs


3. Transvaginal Ultrasound





Transvaginal Ultrasound


IMPRESSION:  IUD and small uterine fibroids.  Free pelvic fluids.





Labs unremarkable.


UA unremarkable. 





Patient given results.  Suggested this may either be her normal menstrual 

period just early or bleeding fibroids.  Suggested she f/u with Dr. Sheehan as 

soon as possible for follow up and return to the ER with any worsening or 

concerning symptoms. 





The patient verbalizes understanding of all instructions, has no further 

questions and is awaiting discharge.





<Lisette Field - Last Filed: 17 10:36>





*DC/Admit/Observation/Transfer





<Lisette Field - Last Filed: 17 10:36>





- Attestations


Physician Attestion: 


I reviewed the case with the mid-level practitioner and agree with the mid-

level practitioner's assessment, diagnosis and disposition.








<Kiki Montalvo - Last Filed: 17 15:39>


Diagnosis at time of Disposition: 


 Abnormal vaginal bleeding





- Discharge Dispostion


Disposition: HOME


Condition at time of disposition: Good





- Referrals


Referrals: 


Tej Sheehan MD [Staff Physician] - 1 week





- Patient Instructions


Printed Discharge Instructions:  DI for Menorrhagia


Additional Instructions: 


Discharge Instructions:


-The ultrasound of your uterus showed fibroids and a normally placed IUD


-Please follow up with Dr. Sheehan or an OB/GYN of your choice as soon as 

possible for further work up


-Return to the ER with any worsening or concerning symptoms








- Post Discharge Activity


Work/School Note:  Back to Work
yes

## 2018-02-22 NOTE — DISCHARGE NOTE ADULT - PATIENT PORTAL LINK FT
You can access the [facility] Patient Portal, offered by [facility], by registering with the following website: [email or web address]

## 2018-02-22 NOTE — PHYSICAL THERAPY INITIAL EVALUATION ADULT - ADDITIONAL COMMENTS
Patient reports independence with all ADLs/IADLs prior to admission. No HHA. Denies mechanical falls prior to admission. Patient is (R) hand dominant and wears visual aides for daily use.

## 2018-02-22 NOTE — PHYSICAL THERAPY INITIAL EVALUATION ADULT - GENERAL OBSERVATIONS, REHAB EVAL
Chart reviewed. IE Completed. Patient without complaints of pain at rest, agreeable to PT. Patient received OOB in chair, NAD, +tele, +IV hep lock, +(R) groin dressing C/D/I, JOEY Calvillo cleared patient for treatment.

## 2018-02-22 NOTE — PHYSICAL THERAPY INITIAL EVALUATION ADULT - GAIT DEVIATIONS NOTED, PT EVAL
decreased idania/decreased step length/slightly unsteady gait however no LOB noted, narrow JOSE and significantly decreased step length

## 2018-02-22 NOTE — PHYSICAL THERAPY INITIAL EVALUATION ADULT - PERTINENT HX OF CURRENT PROBLEM, REHAB EVAL
62 years-old woman with history of atrial fibrillation on chronic anticoagulation (Eliquis) and fibromuscular dysplasia. 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 Please refer to H&P on [place] for remaining.

## 2018-02-23 VITALS — SYSTOLIC BLOOD PRESSURE: 108 MMHG | OXYGEN SATURATION: 100 % | HEART RATE: 60 BPM | DIASTOLIC BLOOD PRESSURE: 50 MMHG

## 2018-02-23 LAB — PA ADP PRP-ACNC: 104 PRU

## 2018-02-23 PROCEDURE — C1769: CPT

## 2018-02-23 PROCEDURE — 80048 BASIC METABOLIC PNL TOTAL CA: CPT

## 2018-02-23 PROCEDURE — C1887: CPT

## 2018-02-23 PROCEDURE — 80053 COMPREHEN METABOLIC PANEL: CPT

## 2018-02-23 PROCEDURE — C1889: CPT

## 2018-02-23 PROCEDURE — 36415 COLL VENOUS BLD VENIPUNCTURE: CPT

## 2018-02-23 PROCEDURE — 85610 PROTHROMBIN TIME: CPT

## 2018-02-23 PROCEDURE — C1876: CPT

## 2018-02-23 PROCEDURE — 85025 COMPLETE CBC W/AUTO DIFF WBC: CPT

## 2018-02-23 PROCEDURE — 83735 ASSAY OF MAGNESIUM: CPT

## 2018-02-23 PROCEDURE — 97116 GAIT TRAINING THERAPY: CPT

## 2018-02-23 PROCEDURE — C1894: CPT

## 2018-02-23 PROCEDURE — 97161 PT EVAL LOW COMPLEX 20 MIN: CPT

## 2018-02-23 PROCEDURE — C1760: CPT

## 2018-02-23 PROCEDURE — 85027 COMPLETE CBC AUTOMATED: CPT

## 2018-02-23 PROCEDURE — 82962 GLUCOSE BLOOD TEST: CPT

## 2018-02-23 PROCEDURE — 84100 ASSAY OF PHOSPHORUS: CPT

## 2018-02-23 PROCEDURE — C2628: CPT

## 2018-02-23 RX ADMIN — Medication 1 MILLIGRAM(S): at 11:07

## 2018-02-23 RX ADMIN — CLOPIDOGREL BISULFATE 75 MILLIGRAM(S): 75 TABLET, FILM COATED ORAL at 11:07

## 2018-02-23 RX ADMIN — Medication 81 MILLIGRAM(S): at 11:07

## 2018-02-23 RX ADMIN — Medication 25 MILLIGRAM(S): at 06:04

## 2018-02-23 NOTE — PROGRESS NOTE ADULT - SUBJECTIVE AND OBJECTIVE BOX
HPI:  62 years-old woman with history of atrial fibrillation on chronic anticoagulation (Eliquis) and fibromuscular dysplasia. 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 She isn’t a smoker and does not have family history of cerebral aneurysms. a diagnostic cerebral angiogram was performed that confirmed the presence of a wide neck irregular 6 mm aneurysm of the right superior hypophyseal artery. There was also evidence of fibromuscular dysplasia in bilateral ICAs.   After discussion in cerebrovascular conference it was determined that the aneurysm should be treated because of size and irregular shape.  Patient presents for cerebral angiography and balloon/Stent assisted coil embolization of aneurysm, she has no neurological deficits. (21 Feb 2018 07:10)    OVERNIGHT EVENTS:  Vital Signs Last 24 Hrs  T(C): 37 (23 Feb 2018 09:31), Max: 37.1 (22 Feb 2018 12:00)  T(F): 98.6 (23 Feb 2018 09:31), Max: 98.7 (22 Feb 2018 12:00)  HR: 56 (23 Feb 2018 08:33) (53 - 68)  BP: 108/51 (23 Feb 2018 08:33) (104/49 - 117/47)  BP(mean): 73 (23 Feb 2018 08:33) (66 - 80)  RR: 12 (23 Feb 2018 08:33) (12 - 20)  SpO2: 100% (23 Feb 2018 08:33) (97% - 100%)    I&O's Summary    22 Feb 2018 07:01  -  23 Feb 2018 07:00  --------------------------------------------------------  IN: 1150 mL / OUT: 710 mL / NET: 440 mL        PHYSICAL EXAM:  Neurological:    Motor exam:         [] Upper extremity              Bi(c5)  WE(c6)  EE(c7)   FF(c8)                                                R         5/5        5/5        5/5       5/5                                               L          5/5        5/5        5/5       5/5         [] Lower extremeity          HF(l2)   KE(l3)    TA(l4)   EHL(l5)  GS(s1)                                                 R        5/5        5/5        5/5       5/5         5/5                                               L         5/5        5/5       5/5       5/5          5/5                                                        [] warm well perfused; capillary refill <3 seconds     Sensation: [] intact to light touch  [] decreased:       Cardiovascular:  Respiratory:  Gastrointestinal:  Genitourinary:  Extremities:  Incision/Wound:    TUBES/LINES:  [] Hendrickson  [] Lumbar Drain  [] Wound Drains  [] Others      DIET:  [] NPO  [] Mechanical  [] Tube feeds    LABS:                        10.1   8.9   )-----------( 205      ( 22 Feb 2018 04:16 )             29.7     02-22    141  |  107  |  15  ----------------------------<  140<H>  3.9   |  26  |  0.65    Ca    8.2<L>      22 Feb 2018 04:16  Phos  3.5     02-22  Mg     2.2     02-22    TPro  6.2  /  Alb  3.7  /  TBili  0.4  /  DBili  x   /  AST  20  /  ALT  16  /  AlkPhos  40  02-21    PT/INR - ( 21 Feb 2018 21:04 )   PT: 11.9 sec;   INR: 1.07                  CAPILLARY BLOOD GLUCOSE  125 (22 Feb 2018 12:00)      POCT Blood Glucose.: 79 mg/dL (23 Feb 2018 06:51)  POCT Blood Glucose.: 104 mg/dL (22 Feb 2018 16:30)  POCT Blood Glucose.: 125 mg/dL (22 Feb 2018 11:43)      Drug Levels: [] N/A    CSF Analysis: [] N/A      Allergies    No Known Allergies    Intolerances      MEDICATIONS:  Antibiotics:    Neuro:  acetaminophen   Tablet 650 milliGRAM(s) Oral every 6 hours PRN  acetaminophen   Tablet. 650 milliGRAM(s) Oral every 6 hours PRN  ondansetron Injectable 4 milliGRAM(s) IV Push every 6 hours PRN    Anticoagulation:  aspirin enteric coated 81 milliGRAM(s) Oral daily  clopidogrel Tablet 75 milliGRAM(s) Oral daily    OTHER:  atorvastatin 20 milliGRAM(s) Oral at bedtime  chlorhexidine 4% Liquid 1 Application(s) Topical Once  docusate sodium 100 milliGRAM(s) Oral three times a day  insulin lispro (HumaLOG) corrective regimen sliding scale   SubCutaneous every 6 hours  metoprolol succinate ER 25 milliGRAM(s) Oral daily  senna 2 Tablet(s) Oral at bedtime    IVF:  folic acid 1 milliGRAM(s) Oral daily    CULTURES:    RADIOLOGY & ADDITIONAL TESTS:      ASSESSMENT:  62y Female s/p Cerebral angiogram for right superior hypophyseal artery aneurysm  stent assisted coil embolization POD#2          PLAN:  -Awaiting PT evaluation for discharge needs,  -Cont Plavix/ASA,  -OOB/PO as tolerated,  -Anticipate DC home,  -Will d/w Dr. Gray HPI:  62 years-old woman with history of atrial fibrillation on chronic anticoagulation (Eliquis) and fibromuscular dysplasia. 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 She isn’t a smoker and does not have family history of cerebral aneurysms. a diagnostic cerebral angiogram was performed that confirmed the presence of a wide neck irregular 6 mm aneurysm of the right superior hypophyseal artery. There was also evidence of fibromuscular dysplasia in bilateral ICAs.   After discussion in cerebrovascular conference it was determined that the aneurysm should be treated because of size and irregular shape.  Patient presents for cerebral angiography and balloon/Stent assisted coil embolization of aneurysm, she has no neurological deficits. (21 Feb 2018 07:10)    OVERNIGHT EVENTS: No significant events. Tolerating OOB. Minimal dizziness when first getting up.    Vital Signs Last 24 Hrs  T(C): 37 (23 Feb 2018 09:31), Max: 37.1 (22 Feb 2018 12:00)  T(F): 98.6 (23 Feb 2018 09:31), Max: 98.7 (22 Feb 2018 12:00)  HR: 56 (23 Feb 2018 08:33) (53 - 68)  BP: 108/51 (23 Feb 2018 08:33) (104/49 - 117/47)  BP(mean): 73 (23 Feb 2018 08:33) (66 - 80)  RR: 12 (23 Feb 2018 08:33) (12 - 20)  SpO2: 100% (23 Feb 2018 08:33) (97% - 100%)    I&O's Summary    22 Feb 2018 07:01  -  23 Feb 2018 07:00  --------------------------------------------------------  IN: 1150 mL / OUT: 710 mL / NET: 440 mL        PHYSICAL EXAM:  Gen: NAD, AAOx3.  HEENT: PERRL. EOMI. VF intact.  Neck: FROM, nontender  Lungs: Clear b/l  Heart: S1, S2. NSR.  Abd: Soft, NT/ND. +BS  Exts: Right groin C/D/I. Pulses 2+ throughout  Neuro: CNs II-XII intact. 5/5 str x4 exts. Sensation to LT intact. Gait intact.    TUBES/LINES:  [] Hendrickson  [] Lumbar Drain  [] Wound Drains  [] Others      DIET:  [] NPO  [x] Mechanical  [] Tube feeds    LABS:                        10.1   8.9   )-----------( 205      ( 22 Feb 2018 04:16 )             29.7     02-22    141  |  107  |  15  ----------------------------<  140<H>  3.9   |  26  |  0.65    Ca    8.2<L>      22 Feb 2018 04:16  Phos  3.5     02-22  Mg     2.2     02-22    TPro  6.2  /  Alb  3.7  /  TBili  0.4  /  DBili  x   /  AST  20  /  ALT  16  /  AlkPhos  40  02-21    PT/INR - ( 21 Feb 2018 21:04 )   PT: 11.9 sec;   INR: 1.07                  CAPILLARY BLOOD GLUCOSE  125 (22 Feb 2018 12:00)      POCT Blood Glucose.: 79 mg/dL (23 Feb 2018 06:51)  POCT Blood Glucose.: 104 mg/dL (22 Feb 2018 16:30)  POCT Blood Glucose.: 125 mg/dL (22 Feb 2018 11:43)      Drug Levels: [] N/A    CSF Analysis: [] N/A      Allergies    No Known Allergies    Intolerances      MEDICATIONS:  Antibiotics:    Neuro:  acetaminophen   Tablet 650 milliGRAM(s) Oral every 6 hours PRN  acetaminophen   Tablet. 650 milliGRAM(s) Oral every 6 hours PRN  ondansetron Injectable 4 milliGRAM(s) IV Push every 6 hours PRN    Anticoagulation:  aspirin enteric coated 81 milliGRAM(s) Oral daily  clopidogrel Tablet 75 milliGRAM(s) Oral daily    OTHER:  atorvastatin 20 milliGRAM(s) Oral at bedtime  chlorhexidine 4% Liquid 1 Application(s) Topical Once  docusate sodium 100 milliGRAM(s) Oral three times a day  insulin lispro (HumaLOG) corrective regimen sliding scale   SubCutaneous every 6 hours  metoprolol succinate ER 25 milliGRAM(s) Oral daily  senna 2 Tablet(s) Oral at bedtime    IVF:  folic acid 1 milliGRAM(s) Oral daily    CULTURES:    RADIOLOGY & ADDITIONAL TESTS:      ASSESSMENT:  62y Female s/p Cerebral angiogram for right superior hypophyseal artery aneurysm  stent assisted coil embolization POD#2          PLAN:  -Awaiting PT evaluation for discharge needs,  -Cont Plavix/ASA,  -OOB/PO as tolerated,  -Anticipate DC home,  -Will d/w Dr. Gray

## 2018-02-27 DIAGNOSIS — I77.3 ARTERIAL FIBROMUSCULAR DYSPLASIA: ICD-10-CM

## 2018-02-27 DIAGNOSIS — Z79.82 LONG TERM (CURRENT) USE OF ASPIRIN: ICD-10-CM

## 2018-02-27 DIAGNOSIS — Z82.49 FAMILY HISTORY OF ISCHEMIC HEART DISEASE AND OTHER DISEASES OF THE CIRCULATORY SYSTEM: ICD-10-CM

## 2018-02-27 DIAGNOSIS — I34.1 NONRHEUMATIC MITRAL (VALVE) PROLAPSE: ICD-10-CM

## 2018-02-27 DIAGNOSIS — E78.5 HYPERLIPIDEMIA, UNSPECIFIED: ICD-10-CM

## 2018-02-27 DIAGNOSIS — Z79.01 LONG TERM (CURRENT) USE OF ANTICOAGULANTS: ICD-10-CM

## 2018-02-27 DIAGNOSIS — I67.1 CEREBRAL ANEURYSM, NONRUPTURED: ICD-10-CM

## 2018-02-27 DIAGNOSIS — I48.91 UNSPECIFIED ATRIAL FIBRILLATION: ICD-10-CM

## 2018-03-08 ENCOUNTER — APPOINTMENT (OUTPATIENT)
Dept: NEUROSURGERY | Facility: CLINIC | Age: 63
End: 2018-03-08
Payer: COMMERCIAL

## 2018-03-08 VITALS
WEIGHT: 115 LBS | HEART RATE: 73 BPM | HEIGHT: 63 IN | DIASTOLIC BLOOD PRESSURE: 72 MMHG | BODY MASS INDEX: 20.38 KG/M2 | SYSTOLIC BLOOD PRESSURE: 109 MMHG | OXYGEN SATURATION: 93 %

## 2018-03-08 DIAGNOSIS — E78.00 PURE HYPERCHOLESTEROLEMIA, UNSPECIFIED: ICD-10-CM

## 2018-03-08 DIAGNOSIS — I48.91 UNSPECIFIED ATRIAL FIBRILLATION: ICD-10-CM

## 2018-03-08 PROCEDURE — 99215 OFFICE O/P EST HI 40 MIN: CPT

## 2018-03-08 RX ORDER — CHROMIUM 200 MCG
TABLET ORAL
Refills: 0 | Status: ACTIVE | COMMUNITY

## 2018-03-08 RX ORDER — ATORVASTATIN CALCIUM 20 MG/1
20 TABLET, FILM COATED ORAL
Refills: 0 | Status: ACTIVE | COMMUNITY

## 2018-05-17 ENCOUNTER — APPOINTMENT (OUTPATIENT)
Dept: MRI IMAGING | Facility: HOSPITAL | Age: 63
End: 2018-05-17
Payer: COMMERCIAL

## 2018-05-17 ENCOUNTER — OUTPATIENT (OUTPATIENT)
Dept: OUTPATIENT SERVICES | Facility: HOSPITAL | Age: 63
LOS: 1 days | End: 2018-05-17
Payer: COMMERCIAL

## 2018-05-17 DIAGNOSIS — Z98.891 HISTORY OF UTERINE SCAR FROM PREVIOUS SURGERY: Chronic | ICD-10-CM

## 2018-05-17 PROCEDURE — 70545 MR ANGIOGRAPHY HEAD W/DYE: CPT

## 2018-05-17 PROCEDURE — A9585: CPT

## 2018-05-17 PROCEDURE — 70545 MR ANGIOGRAPHY HEAD W/DYE: CPT | Mod: 26

## 2018-05-22 ENCOUNTER — APPOINTMENT (OUTPATIENT)
Dept: NEUROSURGERY | Facility: CLINIC | Age: 63
End: 2018-05-22
Payer: COMMERCIAL

## 2018-05-22 VITALS
RESPIRATION RATE: 16 BRPM | SYSTOLIC BLOOD PRESSURE: 100 MMHG | HEART RATE: 60 BPM | HEIGHT: 63 IN | WEIGHT: 118 LBS | BODY MASS INDEX: 20.91 KG/M2 | OXYGEN SATURATION: 100 % | DIASTOLIC BLOOD PRESSURE: 65 MMHG | TEMPERATURE: 97.5 F

## 2018-05-22 PROCEDURE — 99215 OFFICE O/P EST HI 40 MIN: CPT

## 2018-08-22 ENCOUNTER — OUTPATIENT (OUTPATIENT)
Dept: OUTPATIENT SERVICES | Facility: HOSPITAL | Age: 63
LOS: 1 days | Discharge: NURSING, REHAB OR LONG-TERM CARE | End: 2018-08-22
Payer: COMMERCIAL

## 2018-08-22 DIAGNOSIS — Z98.891 HISTORY OF UTERINE SCAR FROM PREVIOUS SURGERY: Chronic | ICD-10-CM

## 2018-08-22 DIAGNOSIS — I67.1 CEREBRAL ANEURYSM, NONRUPTURED: ICD-10-CM

## 2018-08-22 PROCEDURE — C1894: CPT

## 2018-08-22 PROCEDURE — 36224 PLACE CATH CAROTD ART: CPT | Mod: RT

## 2018-08-22 PROCEDURE — 36228 PLACE CATH INTRACRANIAL ART: CPT

## 2018-08-22 PROCEDURE — 36224 PLACE CATH CAROTD ART: CPT | Mod: 50

## 2018-08-22 PROCEDURE — C1760: CPT

## 2018-08-22 PROCEDURE — C1725: CPT

## 2018-08-22 PROCEDURE — C1769: CPT

## 2018-08-22 RX ORDER — SODIUM CHLORIDE 9 MG/ML
500 INJECTION INTRAMUSCULAR; INTRAVENOUS; SUBCUTANEOUS
Qty: 0 | Refills: 0 | Status: DISCONTINUED | OUTPATIENT
Start: 2018-08-22 | End: 2018-08-22

## 2018-08-22 RX ORDER — CHLORHEXIDINE GLUCONATE 213 G/1000ML
1 SOLUTION TOPICAL ONCE
Qty: 0 | Refills: 0 | Status: DISCONTINUED | OUTPATIENT
Start: 2018-08-22 | End: 2018-08-22

## 2018-08-22 RX ORDER — ACETAMINOPHEN 500 MG
1000 TABLET ORAL ONCE
Qty: 0 | Refills: 0 | Status: DISCONTINUED | OUTPATIENT
Start: 2018-08-22 | End: 2018-08-22

## 2018-08-22 RX ORDER — CLOPIDOGREL BISULFATE 75 MG/1
75 TABLET, FILM COATED ORAL DAILY
Qty: 30 | Refills: 0 | Status: DISCONTINUED | OUTPATIENT
Start: 2018-02-12 | End: 2018-08-22

## 2018-08-22 RX ORDER — ASPIRIN 81 MG
81 TABLET,CHEWABLE ORAL DAILY
Refills: 0 | Status: ACTIVE | COMMUNITY

## 2018-08-22 RX ORDER — ACETAMINOPHEN 500 MG
650 TABLET ORAL ONCE
Qty: 0 | Refills: 0 | Status: DISCONTINUED | OUTPATIENT
Start: 2018-08-22 | End: 2018-08-22

## 2018-08-22 NOTE — BRIEF OPERATIVE NOTE - POST-OP DX
Cerebral aneurysm without rupture  08/22/2018  Right sup hypophyseal aneurysm previously treated  Active  Troy Gutierrez

## 2018-08-22 NOTE — BRIEF OPERATIVE NOTE - PROCEDURE
<<-----Click on this checkbox to enter Procedure Cerebral angiography  08/22/2018    Active  GRESTREP

## 2018-08-22 NOTE — PROGRESS NOTE ADULT - SUBJECTIVE AND OBJECTIVE BOX
Neuroendovascular Pre Procedure Note    HPI: 70 year old female w/ PMH of Paroxysmal Afib, HLD, MVP, GERD, Fibromuscular Dysplasia known to Neurosurgery for previous cerebral angiogram for stent and balloon assisted endovascular coil embolization of right superior hypophyseal aneurysm in 2018 presents today for routine diagnostic angiogram 6 months post procedure. She reports doing well with no complaints of pain. She denies visual or hearing changes, extremity weakness or numbness, or changes in gait. She has been taking Aspirin and Plavix since her procedure.    ROS: Patient reports left ankle pain and swelling which is chronic  PMH: PAF, MVP, HLD, GERD, Osteoporosis, Fibromuscular Dysplasia  PSH: As above,  x2, D&C  SocHx: Denies smoking, illicit drug or ETOH abuse. Retired nurse, lives with .  Allergies: NKDA  Meds: Metoprolol 25mg BID, Zantac 300mg daily, Folic Acid 1mg daily, Caltrate D 600/400mg BID, Vitamin D 50,000iu weekly, Aspirin 81mg daily, Plavix 75mg daily    Exam: NAD, AAOx3  HEENT: PERRL. EOMI. VF intact. NC/AT.  Neck; FROM, nontender  Lungs: Clear b/l  Heart: S1, S2. NSR.  Abd: Soft, flat, NT/ND. +BS  Exts: Pulses 2+ throughout, No C/C/E. Tender to left knee palpation  Neuro: CNs II-XII intact. 5/5 str x4 extremities. Sensation to LT intact. Following commands. Gait WNL. No pronator drift.

## 2018-08-22 NOTE — BRIEF OPERATIVE NOTE - OPERATION/FINDINGS
Under MAC sedation, using a 5 fr sheath right CFA, cerebral angiogram was performed.  Findings: Persistent and completely obliterated right superior hypophyseal aneurysm with microcoils and stent.  The stent is widely patent without stenosis.  Full report to follow.  Patient tolerated procedure well, no new neurological deficit, hemodynamically stable.  Right groin/vasc access site: perclose, direct manual compression applied, hemostasis achieved, no hematoma.  Patient was transferred to PACU and will go home later today.  Above d/w Dr. Gray

## 2018-08-22 NOTE — PROGRESS NOTE ADULT - PROBLEM SELECTOR PLAN 1
Consent in chart for diagnostic cerebral angiogram, all risks, benefits and alternatives discussed including but not limited to infection, bleeding and stroke.  Outpatient medical clearance reviewed,  D/w Dr. Gray.

## 2018-08-22 NOTE — PROGRESS NOTE ADULT - ASSESSMENT
62 year old female with previous endovascular treatment of right superior hypophyseal artery aneurysm for elective diagnostic cerebral angiogram.

## 2018-08-24 RX ORDER — APIXABAN 5 MG/1
5 TABLET, FILM COATED ORAL
Refills: 0 | Status: ACTIVE | COMMUNITY

## 2019-07-17 ENCOUNTER — APPOINTMENT (OUTPATIENT)
Dept: MRI IMAGING | Facility: HOSPITAL | Age: 64
End: 2019-07-17
Payer: COMMERCIAL

## 2019-07-17 ENCOUNTER — OUTPATIENT (OUTPATIENT)
Dept: OUTPATIENT SERVICES | Facility: HOSPITAL | Age: 64
LOS: 1 days | End: 2019-07-17
Payer: COMMERCIAL

## 2019-07-17 DIAGNOSIS — Z98.891 HISTORY OF UTERINE SCAR FROM PREVIOUS SURGERY: Chronic | ICD-10-CM

## 2019-07-17 PROCEDURE — A9577: CPT

## 2019-07-17 PROCEDURE — 70546 MR ANGIOGRAPH HEAD W/O&W/DYE: CPT

## 2019-07-17 PROCEDURE — A9579: CPT

## 2019-07-17 PROCEDURE — 70546 MR ANGIOGRAPH HEAD W/O&W/DYE: CPT | Mod: 26

## 2019-07-18 ENCOUNTER — APPOINTMENT (OUTPATIENT)
Dept: NEUROSURGERY | Facility: CLINIC | Age: 64
End: 2019-07-18
Payer: COMMERCIAL

## 2019-07-18 VITALS
BODY MASS INDEX: 20.73 KG/M2 | OXYGEN SATURATION: 98 % | RESPIRATION RATE: 16 BRPM | SYSTOLIC BLOOD PRESSURE: 96 MMHG | WEIGHT: 117 LBS | HEART RATE: 58 BPM | HEIGHT: 63 IN | DIASTOLIC BLOOD PRESSURE: 57 MMHG

## 2019-07-18 PROCEDURE — 99215 OFFICE O/P EST HI 40 MIN: CPT

## 2019-07-18 NOTE — REASON FOR VISIT
[Follow-Up: _____] : a [unfilled] follow-up visit [FreeTextEntry1] : to review 1 year follow MRA brain for balloon and stent assisted endovascular coiling of RIGHT superior hypophyseal aneurysm

## 2019-07-18 NOTE — ASSESSMENT
[FreeTextEntry1] : MRA brain from 7/17/19 reviewed by Dr. Gray showing no recanalization.\par \par Plan:\par 1. Two year follow up MRA brain and RTO in 2 years. Advised patient to call the office 1 month before procedure date.

## 2019-07-18 NOTE — HISTORY OF PRESENT ILLNESS
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 Follow up angiogram from 8/22/18 showed no coil compaction or recanalization of the aneurysm. Plavix was stopped and was instructed to continue baby aspirin and restart OAC for her AF.  She remains on aspirin. No new neurological complains. She presents today to review a 1 year follow up MRA brain.\par \par \par Handedness: RIGHT\par \par Laura Suarez\par [address] [address]\par (h) [phone]\par (c) [phone]\par \par Dr. Hussein(cardiology)\par [address] [address]\par (T) [phone]\par (F) [phone]\par \par Yogesh Mackay, 7[address] (referring physician)\par [facility]\par [address]\par [place], [place] [address]FZ\par \par

## 2019-07-18 NOTE — PHYSICAL EXAM
[General Appearance - Alert] : alert [General Appearance - In No Acute Distress] : in no acute distress [Oriented To Time, Place, And Person] : oriented to person, place, and time [Impaired Insight] : insight and judgment were intact [Person] : oriented to person [Place] : oriented to place [Time] : oriented to time [Cranial Nerves Optic (II)] : visual acuity intact bilaterally,  pupils equal round and reactive to light [Cranial Nerves Oculomotor (III)] : extraocular motion intact [Cranial Nerves Trigeminal (V)] : facial sensation intact symmetrically [Cranial Nerves Facial (VII)] : face symmetrical [Cranial Nerves Vestibulocochlear (VIII)] : hearing was intact bilaterally [Cranial Nerves Glossopharyngeal (IX)] : tongue and palate midline [Cranial Nerves Accessory (XI - Cranial And Spinal)] : head turning and shoulder shrug symmetric [Cranial Nerves Hypoglossal (XII)] : there was no tongue deviation with protrusion [Motor Tone] : muscle tone was normal in all four extremities [Motor Strength] : muscle strength was normal in all four extremities [Neck Appearance] : the appearance of the neck was normal [] : no respiratory distress [Respiration, Rhythm And Depth] : normal respiratory rhythm and effort [Exaggerated Use Of Accessory Muscles For Inspiration] : no accessory muscle use [Apical Impulse] : the apical impulse was normal [Edema] : there was no peripheral edema [Abnormal Walk] : normal gait

## 2021-04-12 ENCOUNTER — APPOINTMENT (OUTPATIENT)
Dept: MRI IMAGING | Facility: HOSPITAL | Age: 66
End: 2021-04-12

## 2021-04-12 ENCOUNTER — RESULT REVIEW (OUTPATIENT)
Age: 66
End: 2021-04-12

## 2021-04-12 ENCOUNTER — OUTPATIENT (OUTPATIENT)
Dept: OUTPATIENT SERVICES | Facility: HOSPITAL | Age: 66
LOS: 1 days | End: 2021-04-12
Payer: MEDICARE

## 2021-04-12 DIAGNOSIS — Z98.891 HISTORY OF UTERINE SCAR FROM PREVIOUS SURGERY: Chronic | ICD-10-CM

## 2021-04-12 PROCEDURE — 70546 MR ANGIOGRAPH HEAD W/O&W/DYE: CPT

## 2021-04-12 PROCEDURE — A9585: CPT

## 2021-04-12 PROCEDURE — 70546 MR ANGIOGRAPH HEAD W/O&W/DYE: CPT | Mod: 26

## 2021-04-12 PROCEDURE — A9579: CPT

## 2021-04-27 ENCOUNTER — APPOINTMENT (OUTPATIENT)
Dept: NEUROSURGERY | Facility: CLINIC | Age: 66
End: 2021-04-27
Payer: MEDICARE

## 2021-04-27 VITALS
HEIGHT: 63 IN | DIASTOLIC BLOOD PRESSURE: 61 MMHG | HEART RATE: 58 BPM | RESPIRATION RATE: 12 BRPM | OXYGEN SATURATION: 98 % | WEIGHT: 118 LBS | TEMPERATURE: 96.8 F | SYSTOLIC BLOOD PRESSURE: 103 MMHG | BODY MASS INDEX: 20.91 KG/M2

## 2021-04-27 PROCEDURE — 99072 ADDL SUPL MATRL&STAF TM PHE: CPT

## 2021-04-27 PROCEDURE — 99213 OFFICE O/P EST LOW 20 MIN: CPT

## 2021-04-27 NOTE — ADDENDUM
[FreeTextEntry1] : 2021\par \par Yogesh Mackay MD\par [address] [facility]\par [address]\[place] [address]\par \par Patient’s Name:  Laura Suarez\par :  1955\par \par Dear Dr. Mackay:\par \par We saw Mrs. Suarez in our office at [facility].  As you know, she is a 65 years-old right-handed woman with history of atrial fibrillation on chronic anticoagulation (Eliquis) and fibromuscular dysplasia.  On a CTA obtained as part of the work up for fibromuscular dysplasia she was diagnosed with an incidental 6 mm wide neck irregular aneurysm of the right superior hypophyseal artery.  She isn’t a smoker and doesn’t have family history of cerebral aneurysms.\par \par She underwent balloon and stent (LVIS) assisted coil embolization of the right superior hypophyseal aneurysm on 2018.  The procedure and recovery were uneventful.  A follow up cerebral angiogram from 2018 demonstrated no in-stent stenosis, coil compaction or recanalization of the treated aneurysm.  A follow up MRA of the brain from earlier this month again demonstrated no recanalization.  A follow up MRA of the brain is recommended in 1 year to assess the treated aneurysm as well as a 1 mm aneurysmal dilatation at the top of the left ICA that has remained stable over the years.  She will follow up in our office at that time.\par \par Thank you for allowing us to participate in Mrs. Suarez’s care.  I will keep you updated at all times.\par \par Sincerely,\par \par \par Jeremiah Gray MD\par Chief\par Neuro-Endovascular Surgery and \par Interventional Neuroradiology \par [facility]\par \par [facility]\par [address]\par [address]rd Floor\[place] [address]\par T:  [phone]\par F:  [phone]\par \par cc:	Buddy Mina MD\par 	Chair, Neurosurgery\par 	[facility]\par 	[address]\par 	3rd Floor\[place] 	[place] [address]\par \par 	Nadeem Hussein MD\par 	[address]\par 	[facility] [place] [address]\par \par 	Mrs. Sanchez Daniela\par 	[address]\par 	[place] [address]\par \par

## 2021-04-27 NOTE — REASON FOR VISIT
[Follow-Up: _____] : a [unfilled] follow-up visit [FreeTextEntry1] : TODAY'S VISIT:\par She is compliant with ASA for stent protection. She complains of GI reflux but has not seen GI for it. She takes ASA with food. No bleeding.\par \par Denies severe new onset headache.\par

## 2021-04-27 NOTE — PHYSICAL EXAM
[General Appearance - Alert] : alert [General Appearance - In No Acute Distress] : in no acute distress [Oriented To Time, Place, And Person] : oriented to person, place, and time [Impaired Insight] : insight and judgment were intact [Affect] : the affect was normal [Neck Appearance] : the appearance of the neck was normal [] : no respiratory distress [Respiration, Rhythm And Depth] : normal respiratory rhythm and effort [Exaggerated Use Of Accessory Muscles For Inspiration] : no accessory muscle use [Apical Impulse] : the apical impulse was normal [Heart Rate And Rhythm] : heart rate was normal and rhythm regular [Abnormal Walk] : normal gait

## 2021-04-27 NOTE — DATA REVIEWED
[de-identified] : [facility]\par    [facility] Department of Radiology\par   Radiology Report\par \par \par Patient Name: EDDIE SOSA  Report Date: 13-Apr-2021 18:15.00 \par Patient ID: [id number] ([id number]), [id number] (EPI)  Accession No.: [id number] \par Patient Birth Date: 1955  Report Status: F \par Referring Physician: [id number] CIPRIANO HAMM   Reason For Study: 2 YR F/U: HX OF STENT ASSISTED COIL EMBOLIZATION OF RT SUPERIOR HYPOPHYSEAL ART  \par \par \par \par \par \par EXAM: MR ANGIO BRAIN WAW IC\par \par PROCEDURE DATE: 04/12/2021\par \par \par \par INTERPRETATION: PROCEDURE: MRA brain with and without contrast.\par \par INDICATION: Stent assisted right superior hypophyseal aneurysm, follow-up\par \par TECHNIQUE: The MRA of the [place] of [place] was performed utilizing 3 D TOF technique. Limited imaging of the brain was obtained including axial DWI and FLAIR. Following the intravenous bolus injection of 7.5 mL IV Gadavist, dynamic MRA was performed utilizing TWIST technique.\par \par COMPARISON: MRA brain 7/17/2019\par \par FINDINGS: Status post stent assisted coil embolization of right superior hypophyseal aneurysm. There is no evidence of flow related signal or abnormal enhancement within the aneurysm sac to suggest residual or recurrent aneurysm.\par \par There is an apparent 1 mm superior directed outpouching of the left carotid terminus (series 103 image 10) which in retrospect is stable prior examination 7/17/2019\par \par There is flow-related signal within the distal bilateral internal carotid arteries without significant stenosis accounting for attenuation of flow related signal in the right ICA from patient's stent. There is flow-related signal within the proximal bilateral anterior middle cerebral arteries without stenotic stenosis.\par \par There is flow-related signal within the distal bilateral vertebral arteries, basilar artery and bilateral posterior cerebral arteries without significant stenosis.\par \par There is no evidence of recent infarction diffusion-weighted imaging. Again noted is a left parietal arachnoid cyst. The ventricles and sulci are appropriate for patient's age. There are scattered foci of white matter signal abnormality within the basis of chronic microvascular ischemic changes.\par \par IMPRESSION: Status post stent assisted coil embolization and right superior apophyseal aneurysm with no evidence of residual or recurrent aneurysm.\par \par Apparent 1 mm superior directed outpouching of the left carotid terminus which in retrospect is similar appearance to prior MRA 7/17/2019 and may represent origin of the small vessel and correlation with prior angiogram is recommended.\par \par \par \par \par Thank you for the opportunity to participate in the care of this patient.\par \par \par MIKAEL DESHPANDE MD; Attending Radiologist\par This document has been electronically signed. Apr 13 2021 6:15PM \par

## 2021-04-27 NOTE — ASSESSMENT
[FreeTextEntry1] :  A follow up MRA of the brain from earlier this month again demonstrated no recanalization.  A follow up MRA of the brain is recommended in 1 year to assess the treated aneurysm as well as a 1 mm aneurysmal dilatation at the top of the left ICA that has remained stable over the years.  She will follow up in our office at that time.\par \par \par Plan:\par 1. Repeat MRA w/wo contrast in 1 yr to assess left carotid terminus outpouching and treated aneurysm. Continue ASA.\par 2. Follow up with GI to assess reflux.

## 2022-04-12 ENCOUNTER — RESULT REVIEW (OUTPATIENT)
Age: 67
End: 2022-04-12

## 2022-04-12 ENCOUNTER — APPOINTMENT (OUTPATIENT)
Dept: MRI IMAGING | Facility: CLINIC | Age: 67
End: 2022-04-12
Payer: MEDICARE

## 2022-04-12 ENCOUNTER — OUTPATIENT (OUTPATIENT)
Dept: OUTPATIENT SERVICES | Facility: HOSPITAL | Age: 67
LOS: 1 days | End: 2022-04-12

## 2022-04-12 DIAGNOSIS — Z98.891 HISTORY OF UTERINE SCAR FROM PREVIOUS SURGERY: Chronic | ICD-10-CM

## 2022-04-12 PROCEDURE — 70544 MR ANGIOGRAPHY HEAD W/O DYE: CPT | Mod: 26

## 2022-04-24 ENCOUNTER — NON-APPOINTMENT (OUTPATIENT)
Age: 67
End: 2022-04-24

## 2022-04-28 ENCOUNTER — APPOINTMENT (OUTPATIENT)
Dept: NEUROSURGERY | Facility: CLINIC | Age: 67
End: 2022-04-28
Payer: MEDICARE

## 2022-04-28 VITALS
BODY MASS INDEX: 20.91 KG/M2 | DIASTOLIC BLOOD PRESSURE: 71 MMHG | OXYGEN SATURATION: 98 % | WEIGHT: 118 LBS | HEART RATE: 60 BPM | HEIGHT: 63 IN | RESPIRATION RATE: 18 BRPM | SYSTOLIC BLOOD PRESSURE: 116 MMHG

## 2022-04-28 DIAGNOSIS — I67.1 CEREBRAL ANEURYSM, NONRUPTURED: ICD-10-CM

## 2022-04-28 PROCEDURE — 99213 OFFICE O/P EST LOW 20 MIN: CPT

## 2022-04-28 RX ORDER — OMEPRAZOLE 20 MG/1
TABLET, DELAYED RELEASE ORAL
Refills: 0 | Status: ACTIVE | COMMUNITY

## 2022-04-28 NOTE — DATA REVIEWED
[de-identified] : [facility]\par    [facility] Imaging at [facility] Department of Radiology\par   Radiology Report\par \par \par Patient Name: EDDIE SOSA  Report Date: 13-Apr-2022 16:29.00 \par Patient ID: [id number] ([id number]), [id number] (EPI)  Accession No.: [id number] \par Patient Birth Date: 1955  Report Status: F \par Referring Physician: [id number] CIPRIANO HAMM   Reason For Study: I67.1  \par \par \par \par \par \par \par \par \par EXAM: MR ANGIO BRAIN\par \par PROCEDURE DATE: 04/12/2022\par \par \par \par \par INTERPRETATION: PROCEDURE: MRA brain without contrast.\par \par INDICATION: Follow-up small left carotid terminus aneurysm and treated right superior hypophyseal aneurysm.\par \par TECHNIQUE: The MRA of the [place] of [place] was performed utilizing 3D TOF technique. Rotational MIP series are provided. Limited imaging of the brain includes axial T2-FLAIR and diffusion imaging.\par \par The study was ordered with contrast, but there was reported IV infiltration of contrast and the patient experienced arm pain. No contrast imaging was performed. The patient was seen by [facility] nursing staff at the time without evidence of further immediate complication.\par \par COMPARISON: 04/12/2021, 07/17/2019, cerebral angiogram 02/05/2018\par \par FINDINGS:\par \par Small and broad based outpouching is subtly appreciated at the left ICA terminus (series 501, image 28), but without saccular configuration,, measuring approximately 2-3 mm base and 1 mm or less in height. This is subtly appreciated in retrospect on conventional angiogram 02/05/2018.\par \par There remains no evidence of residual or recurrent aneurysmal flow signal at the treated right superior hypophyseal aneurysm site. There is preserved antegrade flow signal throughout the anterior and posterior circulation. Right vertebral artery is hypoplastic as is the left A1 segment. Diffusion imaging is negative for recent infarct. The T2-FLAIR images are stable, with incidental note of a left superior parietal arachnoid cyst and unchanged scattered T2-FLAIR intense foci in cerebral white matter likely reflecting long-standing microangiopathy, but mild.\par \par IMPRESSION:\par \par Stable exam. No evidence of residual or recurrent right superior hypophyseal artery aneurysm. Furthermore, broad and minimal outpouching at the left ICA terminus is stable and without saccular configuration.\par \par --- End of Report ---\par \par \par \par \par \par \par TIFFANY URIBE MD; Attending Radiologist\par This document has been electronically signed. Apr 13 2022 4:29PM \par

## 2022-04-28 NOTE — HISTORY OF PRESENT ILLNESS
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 She also has a known 1 mm aneurysmal dilatation at the top of the left ICA that has remained stable over the years.\par \par She underwent balloon and stent (LVIS) assisted coil embolization of the right superior hypophyseal aneurysm in 2/2018. Follow up angiogram from 8/22/18 showed no coil compaction or recanalization of the aneurysm. Plavix was stopped and was instructed to continue baby aspirin and restart OAC for her AF. She remains on aspirin. \par \par MRA of the brain from April 2021 demonstrated no recanalization. A follow up MRA of the brain was recommended in 1 year to assess the treated aneurysm as well as a 1 mm aneurysmal dilatation at the top of the left ICA.\par \par Today, she presents to review MRa brain. Denies new onset headache and acute vision change.\par \par \par Handedness: RIGHT\par \par Laura Suarez\par [address] [address]\par (h) [phone]\par (c) [phone]\par \par Dr. Hussein(cardiology)\par [address] [address]\par (T) [phone]\par (F) [phone]\par \par Yogesh Mackay, Cat (referring physician)\par [facility]\par [address]\par [place], [place] [address]XN\par \par \par

## 2022-04-28 NOTE — ADDENDUM
[FreeTextEntry1] : 2022\par \par Yogesh Mackay MD\par [address] [facility]\par [address]\[place] [address]\par \par Patient’s Name:  Laura Suarez\par :  1955\par \par Dear Dr. Mackay:\par \par We saw Mrs. Suarez in our office at [facility].  As you know, she is a 66 years-old right-handed woman with history of atrial fibrillation on chronic anticoagulation (Eliquis) and fibromuscular dysplasia.  On a CTA obtained as part of the work up for fibromuscular dysplasia she was diagnosed with an incidental 6 mm wide neck irregular aneurysm of the right superior hypophyseal artery.  She isn’t a smoker and doesn’t have family history of cerebral aneurysms.\par \par She underwent balloon and stent (LVIS) assisted coil embolization of the right superior hypophyseal aneurysm on 2018.  The procedure and recovery were uneventful.  A follow up cerebral angiogram from 2018 demonstrated no in-stent stenosis, coil compaction or recanalization of the treated aneurysm.  A follow up MRA of the brain from earlier this month again demonstrated no recanalization.  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  She will follow up in our office at that time.\par \par Thank you for allowing us to participate in Mrs. Suarez’s care.  I will keep you updated at all times.\par \par Sincerely,\par \par \par Jeremiah Gray MD\par Chief\par Neuro-Endovascular Surgery and \par Interventional Neuroradiology \par [facility]\par \par [facility]\par [address]\par 3rd Floor\[place] [address]\par T:  [phone]\par F:  [phone]\par \par cc:	Buddy Mina MD\par 	Chair, Neurosurgery\par 	[facility]\par 	[address]\par 	3rd Floor\[place] 	[place] [address]\par \par 	Nadeem Hussein MD\par 	[address]\par 	[place] [address]\par \par 	Mrs. Sanchez Daniela\par 	[address]\[place] 	[place] [address]\par \par

## 2022-04-28 NOTE — ASSESSMENT
[FreeTextEntry1] : MA follow up MRA of the brain from earlier this month again demonstrated no recanalization.  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  She will follow up in our office at that time.\par \par \par Plan:\par 1. MRA brain w/wo contrast x 2 years (April 2024) to assess the treated right hypophyseal artery aneurysm and left carotid terminus outpouching.

## 2022-04-28 NOTE — REASON FOR VISIT
[Follow-Up: _____] : a [unfilled] follow-up visit [FreeTextEntry1] : to review annual MRA brain for hx of left carotid terminus outpouching and treated aneurysm.

## 2022-04-28 NOTE — PHYSICAL EXAM
[General Appearance - Alert] : alert [General Appearance - In No Acute Distress] : in no acute distress [Oriented To Time, Place, And Person] : oriented to person, place, and time [Impaired Insight] : insight and judgment were intact [Motor Tone] : muscle tone was normal in all four extremities [Motor Strength] : muscle strength was normal in all four extremities [Neck Appearance] : the appearance of the neck was normal [] : no respiratory distress [Respiration, Rhythm And Depth] : normal respiratory rhythm and effort

## 2023-05-20 NOTE — HISTORY OF PRESENT ILLNESS
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 Follow up angiogram from 8/22/18 showed no coil compaction or recanalization of the aneurysm. Plavix was stopped and was instructed to continue baby aspirin and restart OAC for her AF. She remains on aspirin. \par \par Handedness: RIGHT\par \par Laura Huertapilyfrancisca\par [address] [address]\par (h) [phone]\par (c) [phone]\par \par Dr. Hussein(cardiology)\par [address] [address]\par (T) [phone]\par (F) [phone]\par \par Yogesh Mackay, [facility] (referring physician)\par [facility]\par [address]\par [place], [place] [address]\par \par \par  PAST SURGICAL HISTORY:  H/O foot surgery right big toe surgery    H/O knee surgery arthoscopic x4    Hernia, inguinal, bilateral 3 months old    History of appendectomy     History of cholecystectomy     History of knee replacement procedure of right knee BILATERAL

## 2024-05-01 NOTE — BRIEF OPERATIVE NOTE - ANESTHESIOLOGIST NAME
What Type Of Note Output Would You Prefer (Optional)?: Bullet Format Hpi Title: Evaluation of Skin Lesions Dr. Wallace